# Patient Record
Sex: FEMALE | Race: WHITE | Employment: FULL TIME | URBAN - METROPOLITAN AREA
[De-identification: names, ages, dates, MRNs, and addresses within clinical notes are randomized per-mention and may not be internally consistent; named-entity substitution may affect disease eponyms.]

---

## 2018-01-19 ENCOUNTER — OFFICE VISIT (OUTPATIENT)
Dept: OTHER | Facility: HOSPITAL | Age: 18
End: 2018-01-19
Attending: PREVENTIVE MEDICINE

## 2018-01-19 DIAGNOSIS — Z01.84 IMMUNITY STATUS TESTING: Primary | ICD-10-CM

## 2018-01-19 LAB
HBV SURFACE AB SER QL: NONREACTIVE
HBV SURFACE AB SERPL IA-ACNC: 5.97 MIU/ML
RUBELLA IGG QUANTITATIVE: 214.4 IU/ML (ref 10–?)
RUBV IGG SER QL: POSITIVE

## 2018-01-19 PROCEDURE — 86787 VARICELLA-ZOSTER ANTIBODY: CPT

## 2018-01-19 PROCEDURE — 86706 HEP B SURFACE ANTIBODY: CPT

## 2018-01-19 PROCEDURE — 86765 RUBEOLA ANTIBODY: CPT

## 2018-01-19 PROCEDURE — 86735 MUMPS ANTIBODY: CPT

## 2018-01-19 PROCEDURE — 86762 RUBELLA ANTIBODY: CPT

## 2018-01-19 PROCEDURE — 86480 TB TEST CELL IMMUN MEASURE: CPT

## 2018-01-23 LAB
M TB TUBERC IFN-G BLD QL: NEGATIVE
M TB TUBERC IFN-G/MITOGEN IGNF BLD: 0.03 IU/ML
M TB TUBERC IGNF/MITOGEN IGNF CONTROL: >10 IU/ML
MEV IGG SER IA-ACNC: POSITIVE
MITOGEN IGNF BCKGRD COR BLD-ACNC: 0.06 IU/ML
MUV IGG SER IA-ACNC: POSITIVE
VZV IGG SER IA-ACNC: POSITIVE

## 2018-03-13 ENCOUNTER — APPOINTMENT (OUTPATIENT)
Dept: OTHER | Facility: HOSPITAL | Age: 18
End: 2018-03-13
Attending: PREVENTIVE MEDICINE

## 2018-10-02 ENCOUNTER — APPOINTMENT (OUTPATIENT)
Dept: OTHER | Facility: HOSPITAL | Age: 18
End: 2018-10-02
Attending: PREVENTIVE MEDICINE

## 2018-10-08 ENCOUNTER — APPOINTMENT (OUTPATIENT)
Dept: OTHER | Facility: HOSPITAL | Age: 18
End: 2018-10-08
Attending: PREVENTIVE MEDICINE

## 2018-10-10 ENCOUNTER — APPOINTMENT (OUTPATIENT)
Dept: OTHER | Facility: HOSPITAL | Age: 18
End: 2018-10-10
Attending: PREVENTIVE MEDICINE

## 2019-02-15 ENCOUNTER — APPOINTMENT (OUTPATIENT)
Dept: OTHER | Facility: HOSPITAL | Age: 19
End: 2019-02-15
Attending: PREVENTIVE MEDICINE

## 2019-04-17 ENCOUNTER — APPOINTMENT (OUTPATIENT)
Dept: OTHER | Facility: HOSPITAL | Age: 19
End: 2019-04-17
Attending: PREVENTIVE MEDICINE

## 2019-04-24 PROCEDURE — 87077 CULTURE AEROBIC IDENTIFY: CPT | Performed by: INTERNAL MEDICINE

## 2019-04-24 PROCEDURE — 87070 CULTURE OTHR SPECIMN AEROBIC: CPT | Performed by: INTERNAL MEDICINE

## 2019-04-24 PROCEDURE — 87205 SMEAR GRAM STAIN: CPT | Performed by: INTERNAL MEDICINE

## 2019-10-09 ENCOUNTER — APPOINTMENT (OUTPATIENT)
Dept: OTHER | Facility: HOSPITAL | Age: 19
End: 2019-10-09
Attending: PREVENTIVE MEDICINE

## 2019-10-11 ENCOUNTER — APPOINTMENT (OUTPATIENT)
Dept: OTHER | Facility: HOSPITAL | Age: 19
End: 2019-10-11
Attending: PREVENTIVE MEDICINE

## 2020-01-06 ENCOUNTER — HOSPITAL (OUTPATIENT)
Dept: OTHER | Age: 20
End: 2020-01-06

## 2020-01-07 LAB
ANALYZER ANC (IANC): ABNORMAL
ANION GAP SERPL CALC-SCNC: 8 MMOL/L (ref 10–20)
BASOPHILS # BLD: 0.1 K/MCL (ref 0–0.3)
BASOPHILS NFR BLD: 1 %
BUN SERPL-MCNC: 6 MG/DL (ref 6–20)
BUN/CREAT SERPL: 11 (ref 7–25)
CALCIUM SERPL-MCNC: 8.7 MG/DL (ref 8.4–10.2)
CHLORIDE SERPL-SCNC: 113 MMOL/L (ref 98–107)
CO2 SERPL-SCNC: 24 MMOL/L (ref 21–32)
CREAT SERPL-MCNC: 0.57 MG/DL (ref 0.51–0.95)
DIFFERENTIAL METHOD BLD: ABNORMAL
EOSINOPHIL # BLD: 0.9 K/MCL (ref 0.1–0.5)
EOSINOPHIL NFR BLD: 10 %
ERYTHROCYTE [DISTWIDTH] IN BLOOD: 12.1 % (ref 11–15)
ETHANOL SERPL-MCNC: 129 MG/DL
GLUCOSE SERPL-MCNC: 92 MG/DL (ref 65–99)
HCG SERPL QL: NEGATIVE
HCT VFR BLD CALC: 40.6 % (ref 36–46.5)
HGB BLD-MCNC: 13.7 G/DL (ref 12–15.5)
IMM GRANULOCYTES # BLD AUTO: 0 K/MCL (ref 0–0.2)
IMM GRANULOCYTES NFR BLD: 0 %
LYMPHOCYTES # BLD: 2.6 K/MCL (ref 1–4.8)
LYMPHOCYTES NFR BLD: 28 %
MCH RBC QN AUTO: 29.1 PG (ref 26–34)
MCHC RBC AUTO-ENTMCNC: 33.7 G/DL (ref 32–36.5)
MCV RBC AUTO: 86.4 FL (ref 78–100)
MONOCYTES # BLD: 0.5 K/MCL (ref 0.3–0.9)
MONOCYTES NFR BLD: 6 %
NEUTROPHILS # BLD: 5 K/MCL (ref 1.8–7.7)
NEUTROPHILS NFR BLD: 55 %
NEUTS SEG NFR BLD: ABNORMAL %
NRBC (NRBCRE): 0 /100 WBC
PLATELET # BLD: 310 K/MCL (ref 140–450)
POTASSIUM SERPL-SCNC: 3.7 MMOL/L (ref 3.4–5.1)
POTASSIUM SERPL-SCNC: ABNORMAL MMOL/L
RBC # BLD: 4.7 MIL/MCL (ref 4–5.2)
SODIUM SERPL-SCNC: 141 MMOL/L (ref 135–145)
WBC # BLD: 9.1 K/MCL (ref 4.2–11)

## 2020-06-16 ENCOUNTER — HOSPITAL ENCOUNTER (OUTPATIENT)
Dept: GENERAL RADIOLOGY | Age: 20
Discharge: HOME | End: 2020-06-16
Attending: PREVENTIVE MEDICINE

## 2020-06-16 ENCOUNTER — OCC HEALTH (OUTPATIENT)
Dept: OCCUPATIONAL MEDICINE | Age: 20
End: 2020-06-16
Attending: PREVENTIVE MEDICINE

## 2020-06-16 DIAGNOSIS — M25.511 ACUTE PAIN OF RIGHT SHOULDER: ICD-10-CM

## 2020-06-16 DIAGNOSIS — M25.511 ACUTE PAIN OF RIGHT SHOULDER: Primary | ICD-10-CM

## 2020-06-16 PROCEDURE — 73030 X-RAY EXAM OF SHOULDER: CPT | Performed by: PREVENTIVE MEDICINE

## 2020-06-22 ENCOUNTER — APPOINTMENT (OUTPATIENT)
Dept: OTHER | Facility: HOSPITAL | Age: 20
End: 2020-06-22
Attending: FAMILY MEDICINE
Payer: MEDICAID

## 2020-06-26 ENCOUNTER — OFFICE VISIT (OUTPATIENT)
Dept: OTHER | Facility: HOSPITAL | Age: 20
End: 2020-06-26
Attending: FAMILY MEDICINE
Payer: MEDICAID

## 2020-06-26 DIAGNOSIS — M25.511 ACUTE PAIN OF RIGHT SHOULDER: Primary | ICD-10-CM

## 2020-07-06 ENCOUNTER — HOSPITAL ENCOUNTER (OUTPATIENT)
Dept: MRI IMAGING | Age: 20
Discharge: HOME | End: 2020-07-06
Attending: PREVENTIVE MEDICINE
Payer: OTHER MISCELLANEOUS

## 2020-07-06 DIAGNOSIS — M25.511 ACUTE PAIN OF RIGHT SHOULDER: ICD-10-CM

## 2020-07-06 PROCEDURE — 73221 MRI JOINT UPR EXTREM W/O DYE: CPT | Performed by: PREVENTIVE MEDICINE

## 2020-07-07 ENCOUNTER — OFFICE VISIT (OUTPATIENT)
Dept: OTHER | Facility: HOSPITAL | Age: 20
End: 2020-07-07
Attending: PREVENTIVE MEDICINE

## 2020-07-07 DIAGNOSIS — S46.911A RIGHT SHOULDER STRAIN: Primary | ICD-10-CM

## 2020-07-09 ENCOUNTER — TELEPHONE (OUTPATIENT)
Dept: PHYSICAL THERAPY | Facility: HOSPITAL | Age: 20
End: 2020-07-09

## 2020-07-09 NOTE — PROGRESS NOTES
SHOULDER EVALUATION:   Referring Physician: Dr. Catrina Bustamante  Diagnosis: Right shoulder strain (T21.732I)      Date of Service: 7/9/2020     PATIENT SUMMARY   Shila Church is a 22year old female who presents to therapy today with complaints of *** include anxiety, depression       ASSESSMENT  Palmer Zamora presents to physical therapy evaluation with primary c/o ***. The results of the objective tests and measures show ***. Functional deficits include but are not limited to ***.   Signs and symptoms are con ***  External Rotation Resistance: R ***, L ***  Sheets-Alphonso Impingement Sign: R ***, L ***  Neer Impingement Test: R ***, L ***  Palpable Tenderness Infraspinatus and Supraspinatus: R ***, L ***    Long Head Biceps Tendinopathy:   Speed Test: R ***, L ability to don deodorant, don/doff shirts, and wash hair  · Pt will increase shoulder AROM to 85 deg ER and 90 deg ABD to reach and fasten seatbelt   · Pt will increase shoulder AROM IR to 70 deg to be able to reach in back pocket, tuck in shirt, and turn

## 2020-07-10 ENCOUNTER — APPOINTMENT (OUTPATIENT)
Dept: PHYSICAL THERAPY | Facility: HOSPITAL | Age: 20
End: 2020-07-10
Attending: PREVENTIVE MEDICINE
Payer: OTHER MISCELLANEOUS

## 2020-07-10 ENCOUNTER — TELEPHONE (OUTPATIENT)
Dept: PHYSICAL THERAPY | Facility: HOSPITAL | Age: 20
End: 2020-07-10

## 2020-07-10 NOTE — TELEPHONE ENCOUNTER
Patient NS for physical therapy evaluation. Patient reports she forgot. Confirmed appointment for 7 AM for Monday 7/13. Patient needed to cancel appt on 7/16 as will be in [facility] 7/16 until 7/26, so scheduled following week for 3x weekly.

## 2020-07-13 ENCOUNTER — OFFICE VISIT (OUTPATIENT)
Dept: PHYSICAL THERAPY | Facility: HOSPITAL | Age: 20
End: 2020-07-13
Attending: PREVENTIVE MEDICINE
Payer: OTHER MISCELLANEOUS

## 2020-07-13 PROCEDURE — 97110 THERAPEUTIC EXERCISES: CPT

## 2020-07-13 PROCEDURE — 97161 PT EVAL LOW COMPLEX 20 MIN: CPT

## 2020-07-13 NOTE — PROGRESS NOTES
SHOULDER EVALUATION:   Referring Physician: Dr. Sydnee Csise  Diagnosis: Right shoulder strain (H30.346Z)      Date of Service: 7/9/2020     PATIENT SUMMARY   Kasi Felipe is a 22year old female who presents to therapy today with complaints of pain would go okay as long as not long distance   Carrying 50+ lbs None unable   Pushing/Pulling  Cont (%)    Reaching Overhead Occas (6-33%)    Low level work/Bending  Cont (%) Would go fine    Gripping/Twisting/Grasping  Freq (34-66%) No problem with diagnosis of subacromial pain syndrome and rotator cuff strain. Pt and PT discussed evaluation findings, pathology, POC and HEP. Pt voiced understanding and performs HEP correctly without reported pain.  Skilled Physical Therapy is medically necessary 2'  KT tape applied for shoulder stability, scapular depression and posterior humeral setting. Risks and benefits of tape explained to patient. Instructed to remove tape after 4 days, or sooner if signs skin irritation such as itching/burning.  Patient agre treatment limitation: None  Rehab Potential:excellent    Patient/Family/Caregiver was advised of these findings, precautions, and treatment options and has agreed to actively participate in planning and for this course of care.     Thank you for your referr

## 2020-07-14 ENCOUNTER — OFFICE VISIT (OUTPATIENT)
Dept: PHYSICAL THERAPY | Facility: HOSPITAL | Age: 20
End: 2020-07-14
Attending: PREVENTIVE MEDICINE
Payer: OTHER MISCELLANEOUS

## 2020-07-14 PROCEDURE — 97110 THERAPEUTIC EXERCISES: CPT

## 2020-07-14 PROCEDURE — 97140 MANUAL THERAPY 1/> REGIONS: CPT

## 2020-07-14 NOTE — PROGRESS NOTES
Dx: Right shoulder strain (Z61.880F)            Insurance (Authorized # of Visits):  [address]         Authorizing Physician: Dr. Lauren Barcenas MD visit: 7/15/2p0  Fall Risk: standard         Precautions: n/a             Subjective: Exercises went fine at home. daily - 7x weekly   Seated Shoulder External Rotation with Resistance - 10 reps - 2-3 sets - 1x daily - 7x weekly     Goals:    (to be met in 6 visits)   · Pt will report improved ability to sleep without waking due to shoulder pain  · Pt will improve shou

## 2020-07-15 ENCOUNTER — APPOINTMENT (OUTPATIENT)
Dept: OTHER | Facility: HOSPITAL | Age: 20
End: 2020-07-15
Attending: PREVENTIVE MEDICINE

## 2020-07-16 ENCOUNTER — APPOINTMENT (OUTPATIENT)
Dept: PHYSICAL THERAPY | Facility: HOSPITAL | Age: 20
End: 2020-07-16
Attending: PREVENTIVE MEDICINE
Payer: OTHER MISCELLANEOUS

## 2020-07-27 ENCOUNTER — OFFICE VISIT (OUTPATIENT)
Dept: PHYSICAL THERAPY | Facility: HOSPITAL | Age: 20
End: 2020-07-27
Attending: PREVENTIVE MEDICINE
Payer: OTHER MISCELLANEOUS

## 2020-07-27 PROCEDURE — 97110 THERAPEUTIC EXERCISES: CPT

## 2020-07-27 NOTE — PROGRESS NOTES
Dx: Right shoulder strain (N33.706R)            Insurance (Authorized # of Visits):  [address]         Authorizing Physician: Dr. Davin Chi  Next MD visit: 7/15/2p0  Fall Risk: standard         Precautions: n/a             Subjective:Shoulder doing better.  Rates a be met in 6 visits)   · Pt will report improved ability to sleep without waking due to shoulder pain  · Pt will improve shoulder flexion AROM to >160 degrees to be able to reach into overhead cabinets without pain or restriction   · Pt will improve shoulde

## 2020-07-29 ENCOUNTER — OFFICE VISIT (OUTPATIENT)
Dept: PHYSICAL THERAPY | Facility: HOSPITAL | Age: 20
End: 2020-07-29
Attending: Other
Payer: OTHER MISCELLANEOUS

## 2020-07-29 PROCEDURE — 97110 THERAPEUTIC EXERCISES: CPT

## 2020-07-29 PROCEDURE — 97140 MANUAL THERAPY 1/> REGIONS: CPT

## 2020-07-29 NOTE — PROGRESS NOTES
Dx: Right shoulder strain (W44.709U)            Insurance (Authorized # of Visits):  [address] [address]         Authorizing Physician: Dr. Lali Barbosa  Next MD visit: 7/15/2p0  Fall Risk: standard         Precautions: n/a             Subjective:  Reports experiencing \"uncom without pain or restriction   · Pt will improve shoulder abduction AROM to >160 degrees to improve ability to don deodorant, don/doff shirts, and wash hair  · Pt will improve shoulder strength throughout to 5/5 to improve function with ADLs including reach Supine R RS at 90 degrees of SF for 2 minutes. Left side lying:     R ER 2# 2 x 10. R HA 2# x 15.    4 point rocking a/p and m/l x 20 each. Standing wall PUP with SB 2 x 10. .    Standing to 90 degrees:    B SF 2# x 10. B Scaption 2# x 10.      Sta

## 2020-07-30 ENCOUNTER — OFFICE VISIT (OUTPATIENT)
Dept: PHYSICAL THERAPY | Facility: HOSPITAL | Age: 20
End: 2020-07-30
Attending: Other
Payer: OTHER MISCELLANEOUS

## 2020-07-30 PROCEDURE — 97110 THERAPEUTIC EXERCISES: CPT

## 2020-07-30 PROCEDURE — 97140 MANUAL THERAPY 1/> REGIONS: CPT

## 2020-07-30 NOTE — PROGRESS NOTES
Dx: Right shoulder strain (Z68.569K)            Insurance (Authorized # of Visits):  [facility] 6         Authorizing Physician: Dr. Judy Barcenas MD visit: 7/15/2p0  Fall Risk: standard         Precautions: n/a             Subjective:  Reports shoulder soreness.  D - 1x daily - 7x weekly  Single Arm Bent Over Shoulder Horizontal Abduction with Dumbbell - Palm Down - 10 reps - 2-3 sets - 2 hold - 1x daily - 7x weekly  Standing Shoulder Scaption - 10 reps - 3 sets - 1x daily - 7x weekly  Shoulder W - External Rotation mm  Humeral head distraction intermittent x 4'    TE:   SL horizontal abd 1 # 2 x 10  SL scaption 2# 2 x 10 (fatigues)  Row 3 x 12-15 RTB  3 x 10 ER scaption RTB  MWM scapular upward rotation with scaption 2 x 10 (relief, 1-2 incidences of popping felt)  E

## 2020-08-04 ENCOUNTER — OFFICE VISIT (OUTPATIENT)
Dept: PHYSICAL THERAPY | Facility: HOSPITAL | Age: 20
End: 2020-08-04
Attending: Other
Payer: OTHER MISCELLANEOUS

## 2020-08-04 PROCEDURE — 97110 THERAPEUTIC EXERCISES: CPT

## 2020-08-04 PROCEDURE — 97140 MANUAL THERAPY 1/> REGIONS: CPT

## 2020-08-04 NOTE — PROGRESS NOTES
Dx: Right shoulder strain (X95.065X)            Insurance (Authorized # of Visits):  [address]         Authorizing Physician: Dr. Lalitha Barcenas MD visit: 8/5/2020 at 2:30  Fall Risk: standard         Precautions: n/a             Subjective:  Feels shoulder is mo abduction and flexion. Overall, less incidences of popping throughout session but still present intermittent. Scapular weakness remains on R, especially with upward rotators. Improved tolerance for end range flexion with manual upward rotation of scapula. therapeutic exercise including ROM, strengthening, stretching program; neuromuscular re-education for stability and proprioception, patient education for posture, body mechanics, ergonomics; modalities as needed; return to push/pull/lifting; manual therapy # 2 x 10  SL scaption 2# 2 x 10 (fatigues)  Row 3 x 12-15 RTB  3 x 10 ER scaption RTB  MWM scapular upward rotation with scaption 2 x 10 (relief, 1-2 incidences of popping felt)  Ext with scap retraction 2 x 15 RTB  RS 90 flexion 3 x 30s  leuko tape retrac

## 2020-08-05 ENCOUNTER — APPOINTMENT (OUTPATIENT)
Dept: OTHER | Facility: HOSPITAL | Age: 20
End: 2020-08-05
Attending: PREVENTIVE MEDICINE

## 2020-08-14 ENCOUNTER — OFFICE VISIT (OUTPATIENT)
Dept: PHYSICAL THERAPY | Facility: HOSPITAL | Age: 20
End: 2020-08-14
Attending: ORTHOPAEDIC SURGERY
Payer: OTHER MISCELLANEOUS

## 2020-08-14 DIAGNOSIS — M75.101 ROTATOR CUFF SYNDROME OF RIGHT SHOULDER: ICD-10-CM

## 2020-08-14 PROCEDURE — 97110 THERAPEUTIC EXERCISES: CPT

## 2020-08-14 PROCEDURE — 97140 MANUAL THERAPY 1/> REGIONS: CPT

## 2020-08-14 NOTE — PROGRESS NOTES
Dx: Right shoulder strain (K68.776Y)            Insurance (Authorized # of Visits):   6         Authorizing Physician: Dr. Rika Rios  Next MD visit: 8/5/2020 at 2:30  Fall Risk: standard         Precautions: no weights or flys for 2 weeks    Subjective:  Pa cabinets without pain or restriction   · Pt will improve shoulder abduction AROM to >160 degrees to improve ability to don deodorant, don/doff shirts, and wash hair  · Pt will improve shoulder strength throughout to 5/5 to improve function with ADLs includ 8/14/2020  Tx#: 7   Manual:   Post glides G3 with PROM ER  Inferior glides G2 for tolerance with abd, flexion upper range   Gentle humeral distraction   PROM right shoulder by PT; especially into SF and ER.    stm right shoulder area including UT/LS, supra x 15 BTB  Standing: scaption 2# 2s hold, 2 x 10 to 90 dg   Bent over horizontal abd gravity plane 1# 2s x 10 x 2    Standing wall PUP with SB 2 x 10. .    Review of HEP and upgrade   TE:    Re-assessment  Prone horizontal abd with scap setting 2s 3 x 10 (mo

## 2020-08-24 ENCOUNTER — OFFICE VISIT (OUTPATIENT)
Dept: PHYSICAL THERAPY | Facility: HOSPITAL | Age: 20
End: 2020-08-24
Attending: ORTHOPAEDIC SURGERY
Payer: OTHER MISCELLANEOUS

## 2020-08-24 DIAGNOSIS — M75.101 ROTATOR CUFF SYNDROME OF RIGHT SHOULDER: ICD-10-CM

## 2020-08-24 PROCEDURE — 97110 THERAPEUTIC EXERCISES: CPT

## 2020-08-24 PROCEDURE — 97140 MANUAL THERAPY 1/> REGIONS: CPT

## 2020-08-24 NOTE — PROGRESS NOTES
Dx: Right shoulder strain (L94.134H)            Insurance (Authorized # of Visits):  [facility] 8         Authorizing Physician: Dr. Viktoria Silva  Next MD visit: 8/5/2020 at 2:30  Fall Risk: standard         Precautions: no weights or flys for 2 weeks    Subjective:  Pa restriction . MET 8/24/2020  · Pt will improve shoulder abduction AROM to >160 degrees to improve ability to don deodorant, don/doff shirts, and wash hair.   MET 8/24/2020  · Pt will improve shoulder strength throughout to 5/5 to improve function with ADLs x 4' Manual:  Posterior GH glide grade IV  PROM shoulder flexion, IR, abduction  x14' total Manual:  Posterior GH glide grade IV  PROM shoulder flexion, IR, abduction  x13' total   TE:   SL horizontal abd 1 # 2 x 10  SL scaption 2# 2 x 10 (fatigues)  Row 3 trap lift offs x10 (painful)  Flexion stretch on railing 10 sec holds x5  Standing scaption and flexion 0# AROM 2x10 each   Therex:  Supine cane serraus press x20  Rhythmic stabilization 4x30 sec R shoulder  Body blade @0 deg of flexion up/down, L/R 4x20 s

## 2020-08-26 ENCOUNTER — TELEPHONE (OUTPATIENT)
Dept: OBGYN | Age: 20
End: 2020-08-26

## 2020-08-27 ENCOUNTER — OFFICE VISIT (OUTPATIENT)
Dept: PHYSICAL THERAPY | Facility: HOSPITAL | Age: 20
End: 2020-08-27
Attending: ORTHOPAEDIC SURGERY
Payer: OTHER MISCELLANEOUS

## 2020-08-27 DIAGNOSIS — M75.101 ROTATOR CUFF SYNDROME OF RIGHT SHOULDER: ICD-10-CM

## 2020-08-27 PROCEDURE — 97110 THERAPEUTIC EXERCISES: CPT

## 2020-08-27 PROCEDURE — 97140 MANUAL THERAPY 1/> REGIONS: CPT

## 2020-08-27 NOTE — PROGRESS NOTES
Dx: Right shoulder strain (T27.328M)            Insurance (Authorized # of Visits):  [address]         Authorizing Physician: Dr. Ann Barcenas MD visit: 8/5/2020 at 2:30  Fall Risk: standard         Precautions: no weights or flys for 2 weeks    Subjective:  Pa to improve ability to don deodorant, don/doff shirts, and wash hair.   MET 8/24/2020  · Pt will improve shoulder strength throughout to 5/5 to improve function with ADLs including reaching to hang lines, reaching to   · Pt will be able to push 100# without flexion, IR, abduction  x14' total Manual:  Posterior GH glide grade IV  PROM shoulder flexion, IR, abduction  x13' total Manual:  Posterior and inferior GH glide grade IV  PROM shoulder flexion, IR, ER, abduction  x14' total   TE:   SL horizontal abd 1 # 4x30 sec R shoulder  Wall push ups 2x12  Lower trap lift offs x10 (painful)  Flexion stretch on railing 10 sec holds x5  Standing scaption and flexion 0# AROM 2x10 each   Therex:  Supine cane serraus press x20  Rhythmic stabilization 4x30 sec R shoulder  B

## 2020-09-01 ENCOUNTER — OFFICE VISIT (OUTPATIENT)
Dept: PHYSICAL THERAPY | Facility: HOSPITAL | Age: 20
End: 2020-09-01
Attending: ORTHOPAEDIC SURGERY
Payer: OTHER MISCELLANEOUS

## 2020-09-01 DIAGNOSIS — M75.101 ROTATOR CUFF SYNDROME OF RIGHT SHOULDER: ICD-10-CM

## 2020-09-01 PROCEDURE — 97140 MANUAL THERAPY 1/> REGIONS: CPT

## 2020-09-01 PROCEDURE — 97110 THERAPEUTIC EXERCISES: CPT

## 2020-09-01 NOTE — PROGRESS NOTES
Dx: Right shoulder strain (D43.603M)            Insurance (Authorized # of Visits):  [facility] 8         Authorizing Physician: Dr. Drew Aldridge  Next MD visit: 9/3  Fall Risk: standard         Precautions: none   Progress Summary  Pt has attended 10 visits in Physical overhead cabinets without pain or restriction . MET 8/24/2020  · Pt will improve shoulder abduction AROM to >160 degrees to improve ability to don deodorant, don/doff shirts, and wash hair.   MET 8/24/2020  · Pt will improve shoulder strength throughout to shoulder by PT; especially into SF and ER.    stm right shoulder area including UT/LS, supraspinatus mm, biceps tendon for 3-4 minutes. Grade III R GH posterior and caudal mobs by PT for 4 minutes.    Grade III R GH posterior and caudal mobs by PT for 4 m towel up wall x 10 R. TE:    SciFit (4) UEs level 3  2' F and 2' B...   Supine R RS at 90 degrees flexion 45s x 3 RTB  Shoulder W 2 x 15 BTB  Standing: scaption 2# 2s hold, 2 x 10 to 90 dg   Bent over horizontal abd gravity plane 1# 2s x 10 x 2    Stand

## 2020-09-03 ENCOUNTER — TELEPHONE (OUTPATIENT)
Dept: PHYSICAL THERAPY | Age: 20
End: 2020-09-03

## 2020-09-03 ENCOUNTER — APPOINTMENT (OUTPATIENT)
Dept: PHYSICAL THERAPY | Facility: HOSPITAL | Age: 20
End: 2020-09-03
Attending: ORTHOPAEDIC SURGERY
Payer: OTHER MISCELLANEOUS

## 2020-09-08 ENCOUNTER — APPOINTMENT (OUTPATIENT)
Dept: PHYSICAL THERAPY | Facility: HOSPITAL | Age: 20
End: 2020-09-08
Attending: ORTHOPAEDIC SURGERY
Payer: OTHER MISCELLANEOUS

## 2020-09-10 ENCOUNTER — APPOINTMENT (OUTPATIENT)
Dept: PHYSICAL THERAPY | Facility: HOSPITAL | Age: 20
End: 2020-09-10
Attending: ORTHOPAEDIC SURGERY
Payer: OTHER MISCELLANEOUS

## 2020-09-15 ENCOUNTER — APPOINTMENT (OUTPATIENT)
Dept: PHYSICAL THERAPY | Facility: HOSPITAL | Age: 20
End: 2020-09-15
Attending: ORTHOPAEDIC SURGERY
Payer: OTHER MISCELLANEOUS

## 2020-09-17 ENCOUNTER — APPOINTMENT (OUTPATIENT)
Dept: OTHER | Facility: HOSPITAL | Age: 20
End: 2020-09-17
Attending: PREVENTIVE MEDICINE

## 2020-09-29 ENCOUNTER — APPOINTMENT (OUTPATIENT)
Dept: OTHER | Facility: HOSPITAL | Age: 20
End: 2020-09-29
Attending: PREVENTIVE MEDICINE

## 2020-10-01 ENCOUNTER — APPOINTMENT (OUTPATIENT)
Dept: OTHER | Facility: HOSPITAL | Age: 20
End: 2020-10-01
Attending: PREVENTIVE MEDICINE

## 2021-09-27 ENCOUNTER — APPOINTMENT (OUTPATIENT)
Dept: OTHER | Facility: HOSPITAL | Age: 21
End: 2021-09-27
Attending: PREVENTIVE MEDICINE

## 2021-09-30 ENCOUNTER — APPOINTMENT (OUTPATIENT)
Dept: OTHER | Facility: HOSPITAL | Age: 21
End: 2021-09-30
Attending: PREVENTIVE MEDICINE

## 2022-02-09 ENCOUNTER — HOSPITAL ENCOUNTER (EMERGENCY)
Facility: HOSPITAL | Age: 22
Discharge: HOME | End: 2022-02-10
Attending: STUDENT IN AN ORGANIZED HEALTH CARE EDUCATION/TRAINING PROGRAM
Payer: MEDICAID

## 2022-02-09 DIAGNOSIS — K52.9 GASTROENTERITIS: Primary | ICD-10-CM

## 2022-02-09 LAB
ALBUMIN SERPL-MCNC: 3.7 G/DL (ref 3.4–5)
ALBUMIN/GLOB SERPL: 0.9 {RATIO} (ref 1–2)
ALP LIVER SERPL-CCNC: 75 U/L
ALT SERPL-CCNC: 26 U/L
ANION GAP SERPL CALC-SCNC: 6 MMOL/L (ref 0–18)
AST SERPL-CCNC: 29 U/L (ref 15–37)
B-HCG UR QL: NEGATIVE
BASOPHILS # BLD AUTO: 0.02 X10(3) UL (ref 0–0.2)
BASOPHILS NFR BLD AUTO: 0.2 %
BILIRUB SERPL-MCNC: 0.4 MG/DL (ref 0.1–2)
BILIRUB UR QL STRIP.AUTO: NEGATIVE
BUN BLD-MCNC: 13 MG/DL (ref 7–18)
CALCIUM BLD-MCNC: 8.9 MG/DL (ref 8.5–10.1)
CHLORIDE SERPL-SCNC: 106 MMOL/L (ref 98–112)
CO2 SERPL-SCNC: 25 MMOL/L (ref 21–32)
COLOR UR AUTO: YELLOW
CREAT BLD-MCNC: 0.81 MG/DL
EOSINOPHIL # BLD AUTO: 0.01 X10(3) UL (ref 0–0.7)
EOSINOPHIL NFR BLD AUTO: 0.1 %
ERYTHROCYTE [DISTWIDTH] IN BLOOD BY AUTOMATED COUNT: 12 %
FLUAV + FLUBV RNA SPEC NAA+PROBE: NEGATIVE
FLUAV + FLUBV RNA SPEC NAA+PROBE: NEGATIVE
GLOBULIN PLAS-MCNC: 4.1 G/DL (ref 2.8–4.4)
GLUCOSE BLD-MCNC: 122 MG/DL (ref 70–99)
GLUCOSE UR STRIP.AUTO-MCNC: NEGATIVE MG/DL
HCT VFR BLD AUTO: 39.5 %
HGB BLD-MCNC: 13.4 G/DL
IMM GRANULOCYTES # BLD AUTO: 0.03 X10(3) UL (ref 0–1)
IMM GRANULOCYTES NFR BLD: 0.2 %
LIPASE SERPL-CCNC: 48 U/L (ref 73–393)
LYMPHOCYTES # BLD AUTO: 0.39 X10(3) UL (ref 1–4)
LYMPHOCYTES NFR BLD AUTO: 3.2 %
MCH RBC QN AUTO: 28.9 PG (ref 26–34)
MCHC RBC AUTO-ENTMCNC: 33.9 G/DL (ref 31–37)
MCV RBC AUTO: 85.3 FL
MONOCYTES # BLD AUTO: 0.36 X10(3) UL (ref 0.1–1)
MONOCYTES NFR BLD AUTO: 3 %
NEUTROPHILS # BLD AUTO: 11.33 X10 (3) UL (ref 1.5–7.7)
NEUTROPHILS # BLD AUTO: 11.33 X10(3) UL (ref 1.5–7.7)
NEUTROPHILS NFR BLD AUTO: 93.3 %
NITRITE UR QL STRIP.AUTO: NEGATIVE
OSMOLALITY SERPL CALC.SUM OF ELEC: 285 MOSM/KG (ref 275–295)
PH UR STRIP.AUTO: 5 [PH] (ref 5–8)
PLATELET # BLD AUTO: 240 10(3)UL (ref 150–450)
POTASSIUM SERPL-SCNC: 3.8 MMOL/L (ref 3.5–5.1)
PROT SERPL-MCNC: 7.8 G/DL (ref 6.4–8.2)
PROT UR STRIP.AUTO-MCNC: NEGATIVE MG/DL
RBC # BLD AUTO: 4.63 X10(6)UL
RBC UR QL AUTO: NEGATIVE
RSV RNA SPEC NAA+PROBE: NEGATIVE
SARS-COV-2 RNA RESP QL NAA+PROBE: NOT DETECTED
SODIUM SERPL-SCNC: 137 MMOL/L (ref 136–145)
SP GR UR STRIP.AUTO: 1.03 (ref 1–1.03)
UROBILINOGEN UR STRIP.AUTO-MCNC: <2 MG/DL
WBC # BLD AUTO: 12.1 X10(3) UL (ref 4–11)

## 2022-02-09 PROCEDURE — 87086 URINE CULTURE/COLONY COUNT: CPT | Performed by: STUDENT IN AN ORGANIZED HEALTH CARE EDUCATION/TRAINING PROGRAM

## 2022-02-09 PROCEDURE — 99284 EMERGENCY DEPT VISIT MOD MDM: CPT

## 2022-02-09 PROCEDURE — 80053 COMPREHEN METABOLIC PANEL: CPT | Performed by: STUDENT IN AN ORGANIZED HEALTH CARE EDUCATION/TRAINING PROGRAM

## 2022-02-09 PROCEDURE — 96375 TX/PRO/DX INJ NEW DRUG ADDON: CPT

## 2022-02-09 PROCEDURE — 0241U SARS-COV-2/FLU A AND B/RSV BY PCR (GENEXPERT): CPT | Performed by: STUDENT IN AN ORGANIZED HEALTH CARE EDUCATION/TRAINING PROGRAM

## 2022-02-09 PROCEDURE — 83690 ASSAY OF LIPASE: CPT | Performed by: STUDENT IN AN ORGANIZED HEALTH CARE EDUCATION/TRAINING PROGRAM

## 2022-02-09 PROCEDURE — 81001 URINALYSIS AUTO W/SCOPE: CPT | Performed by: STUDENT IN AN ORGANIZED HEALTH CARE EDUCATION/TRAINING PROGRAM

## 2022-02-09 PROCEDURE — 96374 THER/PROPH/DIAG INJ IV PUSH: CPT

## 2022-02-09 PROCEDURE — 81025 URINE PREGNANCY TEST: CPT

## 2022-02-09 PROCEDURE — 85025 COMPLETE CBC W/AUTO DIFF WBC: CPT | Performed by: STUDENT IN AN ORGANIZED HEALTH CARE EDUCATION/TRAINING PROGRAM

## 2022-02-09 RX ORDER — ONDANSETRON 2 MG/ML
4 INJECTION INTRAMUSCULAR; INTRAVENOUS ONCE
Status: COMPLETED | OUTPATIENT
Start: 2022-02-09 | End: 2022-02-09

## 2022-02-09 RX ORDER — KETOROLAC TROMETHAMINE 30 MG/ML
15 INJECTION, SOLUTION INTRAMUSCULAR; INTRAVENOUS ONCE
Status: COMPLETED | OUTPATIENT
Start: 2022-02-09 | End: 2022-02-09

## 2022-02-09 RX ORDER — ONDANSETRON 4 MG/1
4 TABLET, ORALLY DISINTEGRATING ORAL EVERY 8 HOURS PRN
Qty: 10 TABLET | Refills: 0 | Status: SHIPPED | OUTPATIENT
Start: 2022-02-09 | End: 2022-02-16

## 2022-02-10 VITALS
HEART RATE: 86 BPM | RESPIRATION RATE: 16 BRPM | WEIGHT: 143 LBS | HEIGHT: 60 IN | SYSTOLIC BLOOD PRESSURE: 100 MMHG | TEMPERATURE: 99 F | OXYGEN SATURATION: 97 % | DIASTOLIC BLOOD PRESSURE: 46 MMHG | BODY MASS INDEX: 28.07 KG/M2

## 2022-02-10 NOTE — ED QUICK NOTES
Patient discharged to home, AOx3 with no signs of acute distress. Follow-up with PCP and zofran prescription discussed.

## 2022-02-10 NOTE — ED QUICK NOTES
Patient reports feeling \"a little better\" after medication given. Patient AOx3, sitting on cart with family at bedside. Will continue to monitor.

## 2022-02-10 NOTE — ED INITIAL ASSESSMENT (HPI)
Pt ambulatory to er with interm RLQ abd pain since am  Vomited x four   Given zofran odt at IC pta  LAST ATE TUES 1500

## 2022-02-22 ENCOUNTER — OFFICE VISIT (OUTPATIENT)
Dept: FAMILY MEDICINE CLINIC | Facility: CLINIC | Age: 22
End: 2022-02-22
Payer: MEDICAID

## 2022-02-22 VITALS
HEIGHT: 60 IN | OXYGEN SATURATION: 99 % | WEIGHT: 140 LBS | BODY MASS INDEX: 27.48 KG/M2 | SYSTOLIC BLOOD PRESSURE: 122 MMHG | DIASTOLIC BLOOD PRESSURE: 74 MMHG | HEART RATE: 74 BPM | TEMPERATURE: 98 F | RESPIRATION RATE: 16 BRPM

## 2022-02-22 DIAGNOSIS — J40 BRONCHITIS: Primary | ICD-10-CM

## 2022-02-22 PROCEDURE — 3078F DIAST BP <80 MM HG: CPT | Performed by: NURSE PRACTITIONER

## 2022-02-22 PROCEDURE — 99203 OFFICE O/P NEW LOW 30 MIN: CPT | Performed by: NURSE PRACTITIONER

## 2022-02-22 PROCEDURE — 3008F BODY MASS INDEX DOCD: CPT | Performed by: NURSE PRACTITIONER

## 2022-02-22 PROCEDURE — 3074F SYST BP LT 130 MM HG: CPT | Performed by: NURSE PRACTITIONER

## 2022-02-22 RX ORDER — METHYLPREDNISOLONE 4 MG/1
TABLET ORAL
Qty: 1 EACH | Refills: 0 | Status: SHIPPED | OUTPATIENT
Start: 2022-02-22 | End: 2022-03-21 | Stop reason: ALTCHOICE

## 2022-02-22 RX ORDER — ALBUTEROL SULFATE 90 UG/1
AEROSOL, METERED RESPIRATORY (INHALATION)
Qty: 1 EACH | Refills: 1 | Status: SHIPPED | OUTPATIENT
Start: 2022-02-22 | End: 2022-03-21 | Stop reason: ALTCHOICE

## 2022-03-21 ENCOUNTER — OFFICE VISIT (OUTPATIENT)
Dept: FAMILY MEDICINE CLINIC | Facility: CLINIC | Age: 22
End: 2022-03-21
Payer: MEDICAID

## 2022-03-21 VITALS — OXYGEN SATURATION: 98 % | HEART RATE: 99 BPM | TEMPERATURE: 98 F

## 2022-03-21 DIAGNOSIS — J30.2 SEASONAL ALLERGIC RHINITIS, UNSPECIFIED TRIGGER: Primary | ICD-10-CM

## 2022-03-21 PROCEDURE — 99213 OFFICE O/P EST LOW 20 MIN: CPT | Performed by: FAMILY MEDICINE

## 2022-03-21 NOTE — PATIENT INSTRUCTIONS
Use otc antihistamine for allergy control-- zyrtec, xyzal, or allegra are all good options. If needed, add flonase to reduce nasal congestion. Use saline sprays to reduce congestion and to help flush out allergens. Use albuterol as needed for cough or wheezing. If no better in 2-3 days or if symptoms worsen, follow-up for further evaluation.

## 2022-05-05 ENCOUNTER — OFFICE VISIT (OUTPATIENT)
Dept: FAMILY MEDICINE CLINIC | Facility: CLINIC | Age: 22
End: 2022-05-05
Payer: MEDICAID

## 2022-05-05 VITALS
HEIGHT: 60 IN | SYSTOLIC BLOOD PRESSURE: 108 MMHG | OXYGEN SATURATION: 98 % | WEIGHT: 137 LBS | DIASTOLIC BLOOD PRESSURE: 64 MMHG | TEMPERATURE: 98 F | HEART RATE: 71 BPM | BODY MASS INDEX: 26.9 KG/M2 | RESPIRATION RATE: 14 BRPM

## 2022-05-05 DIAGNOSIS — S61.452A DOG BITE OF HAND WITHOUT COMPLICATION, LEFT, INITIAL ENCOUNTER: ICD-10-CM

## 2022-05-05 DIAGNOSIS — S61.259A DOG BITE OF MULTIPLE SITES OF RIGHT HAND AND FINGERS, INITIAL ENCOUNTER: Primary | ICD-10-CM
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
## 2022-05-05 DIAGNOSIS — S61.451A DOG BITE OF MULTIPLE SITES OF RIGHT HAND AND FINGERS, INITIAL ENCOUNTER: Primary | ICD-10-CM

## 2022-05-05 PROCEDURE — 99213 OFFICE O/P EST LOW 20 MIN: CPT | Performed by: PHYSICIAN ASSISTANT

## 2022-05-05 PROCEDURE — 3078F DIAST BP <80 MM HG: CPT | Performed by: PHYSICIAN ASSISTANT

## 2022-05-05 PROCEDURE — 3074F SYST BP LT 130 MM HG: CPT | Performed by: PHYSICIAN ASSISTANT

## 2022-05-05 PROCEDURE — 90471 IMMUNIZATION ADMIN: CPT | Performed by: PHYSICIAN ASSISTANT

## 2022-05-05 PROCEDURE — 90715 TDAP VACCINE 7 YRS/> IM: CPT | Performed by: PHYSICIAN ASSISTANT

## 2022-05-05 PROCEDURE — 3008F BODY MASS INDEX DOCD: CPT | Performed by: PHYSICIAN ASSISTANT

## 2022-05-05 RX ORDER — ALBUTEROL SULFATE 90 UG/1
AEROSOL, METERED RESPIRATORY (INHALATION)
COMMUNITY
Start: 2022-03-21

## 2022-05-05 RX ORDER — FLUTICASONE PROPIONATE 50 MCG
2 SPRAY, SUSPENSION (ML) NASAL DAILY
COMMUNITY

## 2022-05-05 RX ORDER — CETIRIZINE HYDROCHLORIDE 10 MG/1
10 TABLET ORAL DAILY
COMMUNITY

## 2022-05-05 RX ORDER — AMOXICILLIN AND CLAVULANATE POTASSIUM 875; 125 MG/1; MG/1
1 TABLET, FILM COATED ORAL 2 TIMES DAILY
Qty: 14 TABLET | Refills: 0 | Status: SHIPPED | OUTPATIENT
Start: 2022-05-05 | End: 2022-05-12

## 2022-09-26 ENCOUNTER — APPOINTMENT (OUTPATIENT)
Dept: OTHER | Facility: HOSPITAL | Age: 22
End: 2022-09-26
Attending: PREVENTIVE MEDICINE

## 2022-09-29 ENCOUNTER — APPOINTMENT (OUTPATIENT)
Dept: OTHER | Facility: HOSPITAL | Age: 22
End: 2022-09-29
Attending: PREVENTIVE MEDICINE

## 2022-10-28 ENCOUNTER — APPOINTMENT (OUTPATIENT)
Dept: OTHER | Facility: HOSPITAL | Age: 22
End: 2022-10-28
Attending: PREVENTIVE MEDICINE

## 2022-12-20 ENCOUNTER — APPOINTMENT (OUTPATIENT)
Dept: GENERAL RADIOLOGY | Facility: HOSPITAL | Age: 22
End: 2022-12-20
Attending: EMERGENCY MEDICINE
Payer: OTHER MISCELLANEOUS

## 2022-12-20 ENCOUNTER — APPOINTMENT (OUTPATIENT)
Dept: GENERAL RADIOLOGY | Facility: HOSPITAL | Age: 22
End: 2022-12-20
Payer: OTHER MISCELLANEOUS

## 2022-12-20 ENCOUNTER — HOSPITAL ENCOUNTER (EMERGENCY)
Facility: HOSPITAL | Age: 22
Discharge: HOME | End: 2022-12-20
Attending: EMERGENCY MEDICINE
Payer: OTHER MISCELLANEOUS

## 2022-12-20 VITALS
BODY MASS INDEX: 23.95 KG/M2 | RESPIRATION RATE: 19 BRPM | HEART RATE: 76 BPM | SYSTOLIC BLOOD PRESSURE: 101 MMHG | DIASTOLIC BLOOD PRESSURE: 64 MMHG | HEIGHT: 60 IN | OXYGEN SATURATION: 97 % | TEMPERATURE: 98 F | WEIGHT: 122 LBS

## 2022-12-20 DIAGNOSIS — S60.011A CONTUSION OF RIGHT THUMB WITHOUT DAMAGE TO NAIL, INITIAL ENCOUNTER: Primary | ICD-10-CM

## 2022-12-20 PROCEDURE — 73140 X-RAY EXAM OF FINGER(S): CPT | Performed by: EMERGENCY MEDICINE

## 2022-12-20 PROCEDURE — 99283 EMERGENCY DEPT VISIT LOW MDM: CPT

## 2022-12-20 NOTE — DISCHARGE INSTRUCTIONS
Follow-up with occupational health in the next week for reassessment. Return for any concerning symptoms.

## 2022-12-20 NOTE — ED INITIAL ASSESSMENT (HPI)
Right thumb injury from the cabinet door while she was trying to close it on her work place .  Complaining of right thumb swelling and pain

## 2023-03-02 ENCOUNTER — OFFICE VISIT (OUTPATIENT)
Dept: OTHER | Facility: HOSPITAL | Age: 23
End: 2023-03-02
Attending: PREVENTIVE MEDICINE

## 2023-03-02 DIAGNOSIS — Z01.84 IMMUNITY STATUS TESTING: Primary | ICD-10-CM

## 2023-03-02 DIAGNOSIS — Z11.1 SCREENING-PULMONARY TB: ICD-10-CM

## 2023-03-02 LAB
HBV SURFACE AB SER QL: REACTIVE
HBV SURFACE AB SERPL IA-ACNC: >1000 MIU/ML

## 2023-03-02 PROCEDURE — 86706 HEP B SURFACE ANTIBODY: CPT | Performed by: PREVENTIVE MEDICINE

## 2023-03-02 PROCEDURE — 86480 TB TEST CELL IMMUN MEASURE: CPT | Performed by: PREVENTIVE MEDICINE

## 2023-03-06 LAB
M TB IFN-G CD4+ T-CELLS BLD-ACNC: 0.05 IU/ML
M TB TUBERC IFN-G BLD QL: NEGATIVE
M TB TUBERC IGNF/MITOGEN IGNF CONTROL: >10 IU/ML
QFT TB1 AG MINUS NIL: 0.01 IU/ML
QFT TB2 AG MINUS NIL: 0 IU/ML

## 2023-09-27 ENCOUNTER — APPOINTMENT (OUTPATIENT)
Dept: OTHER | Facility: HOSPITAL | Age: 23
End: 2023-09-27
Attending: PREVENTIVE MEDICINE

## 2023-09-29 ENCOUNTER — APPOINTMENT (OUTPATIENT)
Dept: OTHER | Facility: HOSPITAL | Age: 23
End: 2023-09-29
Attending: PREVENTIVE MEDICINE

## 2024-01-18 ENCOUNTER — TELEPHONE (OUTPATIENT)
Dept: INTERNAL MEDICINE CLINIC | Facility: HOSPITAL | Age: 24
End: 2024-01-18

## 2024-01-18 ENCOUNTER — LAB ENCOUNTER (OUTPATIENT)
Dept: LAB | Age: 24
End: 2024-01-18
Attending: PREVENTIVE MEDICINE
Payer: COMMERCIAL

## 2024-01-18 DIAGNOSIS — Z20.822 SUSPECTED COVID-19 VIRUS INFECTION: Primary | ICD-10-CM

## 2024-01-18 DIAGNOSIS — Z20.822 SUSPECTED COVID-19 VIRUS INFECTION: ICD-10-CM

## 2024-01-18 LAB — SARS-COV-2 RNA RESP QL NAA+PROBE: DETECTED

## 2024-01-18 NOTE — TELEPHONE ENCOUNTER
Results and RTW guidelines:    COVID RESULT:    [] Viewed by employee in [email or web address].  RTW plan and instructions as indicated on triage call.  Manager notified.  Estimated RTW date:   [x] Discussed with employee   [] Unable to reach by phone.  Sent via [email or web address] message      Test type:    [x] Rapid         [] Alinity         [] Outside test:           [x] Positive     - Employee should quarantine at home for at least 5 days (day 1 is day after sx onset) , follow the CDC guidelines for cleaning and                              quarantining; see CDC.gov   -This employee may RTW on day 6 if asymptomatic or mildly symptomatic (with improving symptoms).  Call Employee Health on day 5 if unable to return on day 6 after                      symptom onset.    -This employee needs to call Employee Health on day 5 after symptom onset.  The employee needs to be cleared by Employee Health.  - If employee is still experiencing severe symptoms on day 5 must make a RTW appt with Employee Health, Employee will not be cleared if:    1. Has consistent cough, shortness of breath or fatigue that restricts your physical activities    2. Is still feeling \"unwell\"    3. Within 15 days of hospitalization for COVID    4. Within 20 days of intubation for COVID    5. Still has a fever, vomiting or diarrhea   - Keep communication open with management about RTW and if symptoms worsen  - Monitor symptoms and temperature                 - Notify PCP of result                 - Seek emergent care with worsening symptoms                - If outside testing completed, bring a copy of result to RTW appointment           Notes:     RTW PLAN:    [x]  If COVID positive results, off work minimum of 5 days from positive test or onset of symptoms (day 0)        On day 5, if asymptomatic or mildly symptomatic (with improving symptoms) may return to work day 6          On day 5, if symptomatic, call Employee Health for RTW screening        []  COVID positive  result - call Employee Health on day 5 after symptom onset.  The employee needs to be cleared by Employee Health to RTW.  [] RTW immediately, continue to monitor for sx  [] RTW when sx improve; must be fever free for 24 hours w/o medications, Diarrhea/Vomiting free for 24 hours w/o medications  [] Alinity ordered; continue to monitor sx and call for new/worsening sx.  Discuss RTW guidelines with manager  [] May continue to work  [] Follow up with PCP  [] Home until further instruction from hotline with Alinity results  INSTRUCTIONS PROVIDED:  [x]  Plan as noted above  []  Length of time to obtain results   [x]  Quarantine instructions  [x]  Masking protocol   [x]  S/S of worsening infection/condition and importance of prompt medical re-evaluation including when to seek emergency care  [] If symptoms develop, stay home and call hotline for rapid test order    Estimated RTW date:  1/21/24     [x] The employee voiced understanding of above plan/instructions  [x] Manager Notified

## 2024-01-18 NOTE — TELEPHONE ENCOUNTER
[] [facility]  [x][facility]   [] [facility]  Manager : Oriana Byrnes      HAVE YOU RECEIVED THE COVID-19 Vaccine? Yes [x]    No []          If yes, date(s) received:      1/21/21, 2/11/21      Which vaccine:  Pfizer [x]     Moderna []    J&J []      SYMPTOMS (reported via dashboard):  [] asymptomatic  [x] symptomatic  [] GI symptoms only    Symptom onset date: 1/15/24  Fever   > 100F             Yes []      Cough                          Yes [x]      Shortness of breath  Yes []      Congestion                 Yes [x]      Runny nose                Yes [x]        Loss of Smell              Yes []        Loss of Taste             Yes []       Sore throat                 Yes [x]       Fatigue                        Yes [x]       Body Aches                Yes [x]        Chills                           Yes []        Headache                   Yes []             GI symptoms             Yes []     No [x]                     Nausea   []          Vomiting            []                                    Diarrhea  []          Upset stomach []      Employee has positive COVID Exposure?  Yes []     No [x]    Date of exposure:   []  Coworker                       [] patient                        [] Family/friend    Employee has a history of Covid?  Yes []     No [x]   If Yes, when:    When was the last shift you worked?:1/18/24      PLAN:     COVID-19 testing ordered: [x] Rapid    [] Alinity              Date test is to be taken:   1/18/24    []  Outside testing                             INSTRUCTIONS PROVIDED:    [x]  Employee was instructed to call Central scheduling at [phone] or use [employer] to make an appointment for their testing   [x]  May return to work if employee views negative result in MyChart and remains fever, vomiting, and diarrhea free  []  May continue to work if remains asymptomatic and views negative result in MyChart  []  Follow up for condition update when resulting  []  If symptoms develop, stay home and call hotline  for rapid test order  []  If COVID positive results, off work minimum of 5 days from positive test or onset of symptoms (day 0)     [x]  Plan noted above  [x]  Length of time to obtain results  [x]  Quarantine instructions  [x]  S/S of worsening infection/condition and importance of prompt medical re-evaluation including when to seek emergency care.   [] The employee voiced understanding       Notes: LVM, re:  order placed for covid testing

## 2024-09-10 ENCOUNTER — OFFICE VISIT (OUTPATIENT)
Facility: LOCATION | Age: 24
End: 2024-09-10
Payer: COMMERCIAL

## 2024-09-10 DIAGNOSIS — H91.90 SUBJECTIVE HEARING LOSS: Primary | ICD-10-CM

## 2024-09-10 DIAGNOSIS — H93.292 ABNORMAL AUDITORY PERCEPTION OF LEFT EAR: Primary | ICD-10-CM

## 2024-09-10 PROCEDURE — 92557 COMPREHENSIVE HEARING TEST: CPT | Performed by: AUDIOLOGIST

## 2024-09-10 PROCEDURE — 92567 TYMPANOMETRY: CPT | Performed by: AUDIOLOGIST

## 2024-09-10 PROCEDURE — 99204 OFFICE O/P NEW MOD 45 MIN: CPT | Performed by: OTOLARYNGOLOGY

## 2024-09-10 NOTE — PROGRESS NOTES
Kristy Almaraz is a 23 year old female. No chief complaint on file.    HPI:   23-year-old white female works at [employer] is noted subjective decrease in hearing no side predominant.  Has a strong family history for hearing loss grandfather had issues.  She denies otorrhea otalgia vertigo or tinnitus.  She has not had previous ear surgeries.  Current Outpatient Medications   Medication Sig Dispense Refill    albuterol 108 (90 Base) MCG/ACT Inhalation Aero Soln       fluticasone propionate 50 MCG/ACT Nasal Suspension 2 sprays by Each Nare route daily.      cetirizine 10 MG Oral Tab Take 10 mg by mouth daily.        Past Medical History:    Anxiety    Depression    Extrinsic asthma, unspecified    Seasonal allergies    spring only    Seasonal allergies      Social History:  Social History     Socioeconomic History    Marital status: Single   Tobacco Use    Smoking status: Never    Smokeless tobacco: Never    Tobacco comments:     vaping    Vaping Use    Vaping status: Never Used   Substance and Sexual Activity    Alcohol use: No    Drug use: No   Social History Narrative    ** Merged History Encounter **           Past Surgical History:   Procedure Laterality Date    Tonsillectomy           REVIEW OF SYSTEMS:   GENERAL HEALTH: feels well otherwise  GENERAL : denies fever, chills, sweats, weight loss, weight gain  SKIN: denies any unusual skin lesions or rashes  RESPIRATORY: denies shortness of breath with exertion  NEURO: denies headaches    EXAM:   There were no vitals taken for this visit.    System Pertinent findings Details   Constitutional  Overall appearance - Normal.   Psychiatric  Orientation - Oriented to time, place, person & situation. Appropriate mood and affect.   Head/Face  Facial features -- Normal. Skull - Normal.   Eyes  Pupils equal ,round ,react to light and accomidate   Ears  External Ear Right: Normal, Left: Normal. Canal - Right: Normal, Left: Normal. TM - Right: Normal left:  Normal   Nose  External Nose, Normal, Septum -midline nasal Vault, clear,Turbinates - Right: Normal left: Normal   Mouth/Throat  Lips/teeth/gums - Normal. Tonsils -0+. Oropharynx - Normal.   Neck Exam  Inspection - Normal. Palpation - Normal. Parotid gland - Normal. Thyroid gland -normal   Neurological  Cranial nerves - Cranial nerves II through XII grossly intact.   Nasopharynx   Normal        Lymph Detail  Submental. Submandibular. Anterior cervical. Posterior cervical. Supraclavicular.   Normal audiogram today    ASSESSMENT AND PLAN:   1. Subjective hearing loss  Follow-up 18 months pre-clinic audiogram no recommendations since hearing is normal.      The patient indicates understanding of these issues and agrees to the plan.      Lan Schaffer MD  9/10/2024  12:34 PM

## 2024-09-10 NOTE — PROGRESS NOTES
Kristy Almaraz was seen for an audiometric evaluation and tympanogram today. Referred back to physician.    Anna Marie Mixon M.A. SELAM-A

## 2024-09-28 ENCOUNTER — HOSPITAL ENCOUNTER (OUTPATIENT)
Age: 24
Discharge: HOME | End: 2024-09-28
Payer: COMMERCIAL

## 2024-09-28 VITALS
HEIGHT: 60 IN | WEIGHT: 148 LBS | TEMPERATURE: 99 F | SYSTOLIC BLOOD PRESSURE: 113 MMHG | HEART RATE: 72 BPM | DIASTOLIC BLOOD PRESSURE: 54 MMHG | RESPIRATION RATE: 14 BRPM | OXYGEN SATURATION: 98 % | BODY MASS INDEX: 29.06 KG/M2

## 2024-09-28 DIAGNOSIS — T78.40XA ALLERGIC REACTION, INITIAL ENCOUNTER: Primary | ICD-10-CM

## 2024-09-28 PROCEDURE — 96372 THER/PROPH/DIAG INJ SC/IM: CPT

## 2024-09-28 PROCEDURE — 99214 OFFICE O/P EST MOD 30 MIN: CPT

## 2024-09-28 RX ORDER — BUPROPION HYDROCHLORIDE 150 MG/1
TABLET, EXTENDED RELEASE ORAL
COMMUNITY
Start: 2024-06-01

## 2024-09-28 RX ORDER — LAMOTRIGINE 100 MG/1
TABLET ORAL
COMMUNITY
Start: 2024-07-01

## 2024-09-28 RX ORDER — ARIPIPRAZOLE 15 MG/1
TABLET ORAL
COMMUNITY
Start: 2024-06-01

## 2024-09-28 RX ORDER — DEXAMETHASONE SODIUM PHOSPHATE 10 MG/ML
10 INJECTION, SOLUTION INTRAMUSCULAR; INTRAVENOUS ONCE
Status: COMPLETED | OUTPATIENT
Start: 2024-09-28 | End: 2024-09-28

## 2024-09-28 RX ORDER — PREDNISONE 20 MG/1
40 TABLET ORAL DAILY
Qty: 10 TABLET | Refills: 0 | Status: SHIPPED | OUTPATIENT
Start: 2024-09-29 | End: 2024-10-04

## 2024-09-28 RX ORDER — FAMOTIDINE 20 MG/1
20 TABLET, FILM COATED ORAL 2 TIMES DAILY
Qty: 14 TABLET | Refills: 0 | Status: SHIPPED | OUTPATIENT
Start: 2024-09-28 | End: 2024-10-05

## 2024-09-28 NOTE — ED INITIAL ASSESSMENT (HPI)
Pt. Woke up yesterday with a generalized rash. States that she took benadryl then today woke up and the rash is worse. States that she started taking a hair/skin/nail supplement on Monday but denies using any other new products on her skin.

## 2024-09-28 NOTE — ED PROVIDER NOTES
Patient Seen in: [facility]      History     Chief Complaint   Patient presents with    Rash Skin Problem     Stated Complaint: rash    Subjective:   HPI    Patient states she woke up yesterday with itchy rash to her torso.  She took Benadryl with no relief of symptoms and rash has progressively worsened today.  Denies any new soaps/lotions/detergents.  States that she did recently start a new vitamin several days prior to the rash developing.  Denies tongue/lip/throat swelling or any difficulty breathing.  Denies prior similar allergic reactions.  Denies any other complaints or concerns at this time.    Objective:   Past Medical History:    Anxiety    Depression    Extrinsic asthma, unspecified    Seasonal allergies    spring only    Seasonal allergies              Past Surgical History:   Procedure Laterality Date    Tonsillectomy                  Social History     Socioeconomic History    Marital status: Single   Tobacco Use    Smoking status: Never    Smokeless tobacco: Never    Tobacco comments:     vaping    Vaping Use    Vaping status: Some Days   Substance and Sexual Activity    Alcohol use: Yes     Comment: occasional    Drug use: No   Social History Narrative    ** Merged History Encounter **                   Review of Systems    Positive for stated Chief Complaint: Rash Skin Problem    Other systems are as noted in HPI.  Constitutional and vital signs reviewed.      All other systems reviewed and negative except as noted above.    Physical Exam     ED Triage Vitals [09/28/24 1110]   /54   Pulse 72   Resp 14   Temp 99 °F (37.2 °C)   Temp src Oral   SpO2 98 %   O2 Device None (Room air)       Current Vitals:   Vital Signs  BP: 113/54  Pulse: 72  Resp: 14  Temp: 99 °F (37.2 °C)  Temp src: Oral    Oxygen Therapy  SpO2: 98 %  O2 Device: None (Room air)            Physical Exam  Vitals and nursing note reviewed.   Constitutional:       Appearance: Normal appearance.   HENT:      Head:  Normocephalic.   Eyes:      Conjunctiva/sclera: Conjunctivae normal.   Cardiovascular:      Rate and Rhythm: Normal rate and regular rhythm.      Heart sounds: Normal heart sounds.   Pulmonary:      Effort: Pulmonary effort is normal.      Breath sounds: Normal breath sounds. No wheezing.   Musculoskeletal:         General: Normal range of motion.   Skin:     General: Skin is warm and dry.      Findings: Rash present. Rash is urticarial (Diffusely to trunk and few spots bilateral upper extremities).   Neurological:      General: No focal deficit present.      Mental Status: She is alert.   Psychiatric:         Mood and Affect: Mood normal.               ED Course   Labs Reviewed - No data to display                   MDM      Differential diagnosis includes but is not limited to allergic reaction, contact dermatitis, viral exanthem.    Patient well-appearing, nontoxic.  She has diffuse urticarial rash to trunk.  No wheezing or airway compromise.  Patient is in no respiratory distress.  Consider recent vitamin is trigger for reaction.  Discussed plan for IM steroids here.  Plan to discharge home with prednisone, Pepcid and advised continued over-the-counter antihistamines.  I advised supportive care at home, follow-up and provided return precautions.  Patient verbalized understanding agreement plan.                                   Medical Decision Making  Risk  Prescription drug management.        Disposition and Plan     Clinical Impression:  1. Allergic reaction, initial encounter         Disposition:  Discharge  9/28/2024 11:50 am    Follow-up:  Nhung Keen MD  [address]  [facility] [address]  [phone]    In 3 days      Anna Dorman MD  [address] [place] [id number]  [phone]                Medications Prescribed:  Discharge Medication List as of 9/28/2024 12:03 PM        START taking these medications    Details   predniSONE 20 MG Oral Tab Take 2 tablets (40 mg  total) by mouth daily for 5 days., Normal, Disp-10 tablet, R-0      famotidine (PEPCID) 20 MG Oral Tab Take 1 tablet (20 mg total) by mouth 2 (two) times daily for 7 days., Normal, Disp-14 tablet, R-0

## 2024-09-28 NOTE — DISCHARGE INSTRUCTIONS
Continue over-the-counter antihistamines (i.e. Claritin, Zyrtec) during the daytime and Benadryl at bedtime as needed.    Go to ER for new/worsening symptoms (i.e. tongue/lip/throat swelling, difficulty breathing, vomiting, etc.)

## 2025-08-25 ENCOUNTER — OFFICE VISIT (OUTPATIENT)
Dept: FAMILY MEDICINE CLINIC | Facility: CLINIC | Age: 25
End: 2025-08-25

## 2025-08-25 VITALS
BODY MASS INDEX: 30 KG/M2 | WEIGHT: 155.63 LBS | HEART RATE: 72 BPM | DIASTOLIC BLOOD PRESSURE: 72 MMHG | OXYGEN SATURATION: 98 % | RESPIRATION RATE: 16 BRPM | TEMPERATURE: 100 F | SYSTOLIC BLOOD PRESSURE: 112 MMHG

## 2025-08-25 DIAGNOSIS — J06.9 VIRAL URI WITH COUGH: Primary | ICD-10-CM

## 2025-08-25 DIAGNOSIS — Z20.822 SUSPECTED COVID-19 VIRUS INFECTION: ICD-10-CM

## 2025-08-25 DIAGNOSIS — J02.9 SORE THROAT: ICD-10-CM

## 2025-08-25 LAB
CONTROL LINE PRESENT WITH A CLEAR BACKGROUND (YES/NO): YES YES/NO
KIT LOT #: NORMAL NUMERIC
OPERATOR ID: NORMAL
RAPID SARS-COV-2 BY PCR: NOT DETECTED
STREP GRP A CUL-SCR: NEGATIVE

## (undated) NOTE — LETTER
Patient Name: Anai Jones  YOB: 2000          MRN :  [id number]  Date:  9/1/2020  Referring Physician:  Eddy Aburto    Progress Summary  Pt has attended 10 visits in Physical Therapy.    Subjective:  Patient reports she develope reach into overhead cabinets without pain or restriction . MET 8/24/2020  · Pt will improve shoulder abduction AROM to >160 degrees to improve ability to don deodorant, don/doff shirts, and wash hair.   MET 8/24/2020  · Pt will improve shoulder strength th

## (undated) NOTE — LETTER
Date & Time: 2/9/2022, 11:58 PM  Patient: Cruzito Parks  Encounter Provider(s):    Khloe Smith MD       To Whom It May Concern:    Cruzito Parks was seen and treated in our department on 2/9/2022. She should be excused from work 2/10/22.     If you have any questions or concerns, please do not hesitate to call.        _____________________________  Physician/[facility] Signature